# Patient Record
Sex: FEMALE | Race: WHITE | NOT HISPANIC OR LATINO | URBAN - METROPOLITAN AREA
[De-identification: names, ages, dates, MRNs, and addresses within clinical notes are randomized per-mention and may not be internally consistent; named-entity substitution may affect disease eponyms.]

---

## 2021-12-22 ENCOUNTER — PREPPED CHART (OUTPATIENT)
Dept: URBAN - METROPOLITAN AREA CLINIC 21 | Facility: CLINIC | Age: 71
End: 2021-12-22

## 2022-01-18 ASSESSMENT — VISUAL ACUITY
OS_SC: 20/25-1
OD_SC: 20/30-1

## 2022-01-18 ASSESSMENT — TONOMETRY
OD_IOP_MMHG: 8
OS_IOP_MMHG: 7

## 2022-01-19 ENCOUNTER — ESTABLISHED PATIENT (OUTPATIENT)
Dept: URBAN - METROPOLITAN AREA CLINIC 21 | Facility: CLINIC | Age: 72
End: 2022-01-19

## 2022-01-19 DIAGNOSIS — Z96.1: ICD-10-CM

## 2022-01-19 PROCEDURE — 99024 POSTOP FOLLOW-UP VISIT: CPT

## 2022-01-19 ASSESSMENT — VISUAL ACUITY
OS_SC: 20/30
OU_SC: J1
OU_SC: 20/25
OD_SC: 20/25-2

## 2022-01-19 ASSESSMENT — TONOMETRY
OS_IOP_MMHG: 6
OD_IOP_MMHG: 7

## 2022-04-19 ENCOUNTER — POST-OP (OUTPATIENT)
Dept: URBAN - METROPOLITAN AREA CLINIC 21 | Facility: CLINIC | Age: 72
End: 2022-04-19

## 2022-04-19 DIAGNOSIS — H35.363: ICD-10-CM

## 2022-04-19 PROCEDURE — 92012 INTRM OPH EXAM EST PATIENT: CPT

## 2022-04-19 ASSESSMENT — TONOMETRY
OS_IOP_MMHG: 7
OD_IOP_MMHG: 7

## 2022-04-19 ASSESSMENT — VISUAL ACUITY
OS_SC: 20/20+1
OD_SC: 20/20+2

## 2022-10-31 NOTE — PATIENT DISCUSSION
Stable. O-L Flap Text: The defect edges were debeveled with a #15 scalpel blade.  Given the location of the defect, shape of the defect and the proximity to free margins an O-L flap was deemed most appropriate.  Using a sterile surgical marker, an appropriate advancement flap was drawn incorporating the defect and placing the expected incisions within the relaxed skin tension lines where possible.    The area thus outlined was incised deep to adipose tissue with a #15 scalpel blade.  The skin margins were undermined to an appropriate distance in all directions utilizing iris scissors.

## 2023-08-04 ENCOUNTER — ESTABLISHED PATIENT (OUTPATIENT)
Dept: URBAN - METROPOLITAN AREA CLINIC 20 | Facility: CLINIC | Age: 73
End: 2023-08-04

## 2023-08-04 DIAGNOSIS — H26.493: ICD-10-CM

## 2023-08-04 DIAGNOSIS — H35.363: ICD-10-CM

## 2023-08-04 PROCEDURE — 66821 AFTER CATARACT LASER SURGERY: CPT

## 2023-08-04 PROCEDURE — 92014 COMPRE OPH EXAM EST PT 1/>: CPT

## 2023-08-04 PROCEDURE — 92134 CPTRZ OPH DX IMG PST SGM RTA: CPT

## 2023-08-04 RX ORDER — PREDNISOLONE ACETATE 10 MG/ML: 1 SUSPENSION/ DROPS OPHTHALMIC

## 2023-08-04 ASSESSMENT — VISUAL ACUITY
OU_SC: 20/30+2
OD_SC: 20/40-1
OU_SC: J1-1
OS_SC: 20/40

## 2023-08-04 ASSESSMENT — KERATOMETRY
OD_AXISANGLE2_DEGREES: 66
OS_AXISANGLE2_DEGREES: 104
OD_AXISANGLE_DEGREES: 156
OS_AXISANGLE_DEGREES: 14
OS_K1POWER_DIOPTERS: 40.50
OD_K2POWER_DIOPTERS: 40.75
OS_K2POWER_DIOPTERS: 41.50
OD_K1POWER_DIOPTERS: 40.25

## 2023-08-04 ASSESSMENT — TONOMETRY
OS_IOP_MMHG: 8
OD_IOP_MMHG: 8

## 2023-09-01 ENCOUNTER — ESTABLISHED PATIENT (OUTPATIENT)
Dept: URBAN - METROPOLITAN AREA CLINIC 20 | Facility: CLINIC | Age: 73
End: 2023-09-01

## 2023-09-01 DIAGNOSIS — Z98.890: ICD-10-CM

## 2023-09-01 DIAGNOSIS — H26.492: ICD-10-CM

## 2023-09-01 PROCEDURE — 99024 POSTOP FOLLOW-UP VISIT: CPT

## 2023-09-01 PROCEDURE — 66821 AFTER CATARACT LASER SURGERY: CPT

## 2023-09-01 ASSESSMENT — TONOMETRY
OD_IOP_MMHG: 10
OS_IOP_MMHG: 10

## 2023-09-01 ASSESSMENT — VISUAL ACUITY
OD_SC: 20/25+2
OS_SC: 20/40

## 2023-10-10 ENCOUNTER — ESTABLISHED PATIENT (OUTPATIENT)
Dept: URBAN - METROPOLITAN AREA CLINIC 21 | Facility: CLINIC | Age: 73
End: 2023-10-10

## 2023-10-10 DIAGNOSIS — Z98.890: ICD-10-CM

## 2023-10-10 PROCEDURE — 99024 POSTOP FOLLOW-UP VISIT: CPT

## 2023-10-10 ASSESSMENT — VISUAL ACUITY
OS_SC: 20/30
OU_SC: 20/25-2
OD_SC: 20/30

## 2023-10-10 ASSESSMENT — TONOMETRY
OD_IOP_MMHG: 9
OS_IOP_MMHG: 9

## 2024-06-25 ENCOUNTER — ESTABLISHED PATIENT (OUTPATIENT)
Dept: URBAN - METROPOLITAN AREA CLINIC 21 | Facility: CLINIC | Age: 74
End: 2024-06-25

## 2024-06-25 DIAGNOSIS — H35.363: ICD-10-CM

## 2024-06-25 DIAGNOSIS — Z98.890: ICD-10-CM

## 2024-06-25 DIAGNOSIS — H16.223: ICD-10-CM

## 2024-06-25 PROCEDURE — 99212 OFFICE O/P EST SF 10 MIN: CPT

## 2024-06-25 ASSESSMENT — VISUAL ACUITY
OD_SC: 20/30+3
OS_SC: 20/25-1

## 2024-06-25 ASSESSMENT — TONOMETRY
OS_IOP_MMHG: 10
OD_IOP_MMHG: 10

## 2025-07-01 ENCOUNTER — COMPREHENSIVE EXAM (OUTPATIENT)
Age: 75
End: 2025-07-01

## 2025-07-01 DIAGNOSIS — H35.363: ICD-10-CM

## 2025-07-01 PROCEDURE — 99214 OFFICE O/P EST MOD 30 MIN: CPT

## 2025-07-01 RX ORDER — CYCLOSPORINE 0 MG/ML: 1 SOLUTION/ DROPS OPHTHALMIC; TOPICAL TWICE A DAY
